# Patient Record
Sex: MALE | Race: WHITE | NOT HISPANIC OR LATINO
[De-identification: names, ages, dates, MRNs, and addresses within clinical notes are randomized per-mention and may not be internally consistent; named-entity substitution may affect disease eponyms.]

---

## 2021-09-17 PROBLEM — Z00.00 ENCOUNTER FOR PREVENTIVE HEALTH EXAMINATION: Status: ACTIVE | Noted: 2021-09-17

## 2021-09-20 ENCOUNTER — APPOINTMENT (OUTPATIENT)
Dept: NEUROLOGY | Facility: CLINIC | Age: 76
End: 2021-09-20
Payer: MEDICARE

## 2021-09-20 ENCOUNTER — NON-APPOINTMENT (OUTPATIENT)
Age: 76
End: 2021-09-20

## 2021-09-20 VITALS
BODY MASS INDEX: 21.47 KG/M2 | HEART RATE: 69 BPM | HEIGHT: 70 IN | DIASTOLIC BLOOD PRESSURE: 72 MMHG | WEIGHT: 150 LBS | TEMPERATURE: 96.3 F | SYSTOLIC BLOOD PRESSURE: 134 MMHG

## 2021-09-20 PROCEDURE — 99205 OFFICE O/P NEW HI 60 MIN: CPT

## 2021-09-20 NOTE — HISTORY OF PRESENT ILLNESS
[FreeTextEntry1] : 76 yr old male with prior history of cognitive issues after getting COVID-19 infection in Nov 2020. He was hospitalized for the COVID 19 infection for 5 days  but was  never intubated. He went home with oxygen. In the spring of 2021 his wife was concerned about his cognitive symptoms that seemed to be progressing. She had noted  he was having  difficulty updating his calendar and keeping up with  his appointments. He would not recall recent conversation. He underwent cognitive evaluation  with Dr. Penaloza in [facility] and was due to have an MRI brain as outpatient. However, on June 22, 2021 he developed a fever and he seemed more confused than usual. He was admitted at [facility] for acute intracerebral hemorrhage. He had an MRI brain but it remains unclear what the cause for the stroke was. He was discharged to [facility] for acute rehab followed by subacute rehab at [facility]. As per his wife he did not have any seizures during his hospital stay. His cognitive function is even worse now since the stroke. He is very slow to respond and due to impaired memory you cannot have a lengthy conversation . He does not recognize family members and recently confused his sister in law with his daughter . He will perseverate and keep pulling on items in the bed . He is still walking with assistance of a walker .  No change sin level of consciousness, irritability, aggression reported.

## 2021-09-20 NOTE — ASSESSMENT
[FreeTextEntry1] : 76 yr old male with prior history of cognitive issues after COVID 19 infection suffered a ICH in June 2021 which resulted in increased severity of cognitive deficits and gait impairment. Based on my exam the patient has marked impairment of fund of knowledge, recent memory, remote memory, and apraxia. I have no records to review at this visit and requested medical release form to obtain prior records. I suggested wife get CD of prior MRI and I will order another MRI/MRA brain for comparison. The patient is on Keppra but unsure if prior ICH truly poses seizure risk till I review prior records and recent EEG. I will order basic labs for review as well \par \par During this visit I did the following :\par  Preparing to see the patient , obtaining and/or reviewing separately obtained history, performing a medically appropriate examination and/or evaluation, counseling and educating the patient/family/caregiver, ordering medications, tests or procedures, documenting clinical information in the electronic or other health record and care coordination , also documented information on [facility] follow up sheet\par Time spent 85 min

## 2021-09-20 NOTE — HISTORY OF PRESENT ILLNESS
[FreeTextEntry1] : 76 yr old male with prior history of cognitive issues after getting COVID-19 infection in Nov 2020. He was hospitalized for the COVID 19 infection for 5 days  but was  never intubated. He went home with oxygen. In the spring of 2021 his wife was concerned about his cognitive symptoms that seemed to be progressing. She had noted  he was having  difficulty updating his calendar and keeping up with  his appointments. He would not recall recent conversation. He underwent cognitive evaluation  with Dr. Penaloza in [facility] and was due to have an MRI brain as outpatient. However, on June 22, 2021 he developed a fever and he seemed more confused than usual. He was admitted at [facility] for acute intracerebral hemorrhage. He had an MRI brain but it remains unclear what the cause for the stroke was. He was discharged to [facility] for acute rehab followed by subacute rehab at [facility]. As per his wife he did not have any seizures during his hospital stay. His cognitive function is even worse now since the stroke. He is very slow to respond and due to impaired memory you cannot have a lengthy conversation . He does not recognize family members and recently confused his sister in law with his daughter . He will perseverate and keep pulling on items in the bed . He is still walking with assistance of a walker .  No change sin level of consciousness, irritability, aggression reported.

## 2021-09-20 NOTE — PHYSICAL EXAM
[FreeTextEntry1] : Constitutional : Patient appears comfortable and well nourished\par Neck : Supple and no carotid bruit\par CVS: s1 and s2 regular rate and rhythm\par Extremity: no cyanosis or edema; peripheral pulses present\par MENTAL STATUS: alert and oriented only to seld, he recognizes wife but was wrong about th eyears they were . He told me the incorrect age of his two children. He is unaware of pandemic , registered 3 objectsn and recall was poor, fund of knowledge appropriate\par Memory: registration of new information    ; 5 minute recall     ; long term memory impaired\par Concentration : can spell world backwords \par Apraxia : Can  not draw clock and continues to write numbers sequentially\par Agnosia: recognizes objects and their function\par Delusions Absent\par MMSE Score \par LANG/SPEECH: Naming and repetition intact, fluent, follows 3-step commands, write a sentence\par CRANIAL NERVES:\par II: Pupils equal and reactive, no RAPD, no VF deficits, normal fundus\par III, IV, VI: EOM intact, no gaze preference or deviation, no nystagmus.\par V: normal sensation in V1, V2, and V3 segments bilaterally\par VII: no asymmetry, no nasolabial fold flattening\par VIII: normal hearing to speech\par IX, X: normal palatal elevation, no uvular deviation\par XI: 5/5 head turn and 5/5 shoulder shrug bilaterally\par XII: midline tongue protrusion\par MOTOR:\par 5/5 muscle power in Rt shoulder abductors/adductors, elbow flexors/extensors, wrist flexors/extensors, finger abductors/adductors.  5/5 in Rt hipflexors/extensors, knee flexors/extensors, ankle dorsiflexors and planter flexors.\par  \par 5/5 muscle power in Lt shoulder abductors/adductors, elbow flexors/extensors, wrist flexors/extensors, finger abductors/adductors.  5/5 in Lt hipflexors/extensors, knee flexors/extensors, ankle dorsiflexors and planter flexors.\par  \par REFLEXES: 1/4 throughout, bilateral flexor planter response, no Bee's, no clonus\par SENSORY:\par Normal to touch, pinprick, \par No hemineglect, no extinction to double sided stimulation (visual & tactile)\par Romberg absent\par COORD: Normal finger to nose and heel to shin, no tremor, no dysmetria\par STATION: when attempted to stand he is very unsteady and tends to shift his base backwards \par He was unable to walk or mimic one foot infront of the other suggesting gait apraxis \par

## 2021-09-27 ENCOUNTER — APPOINTMENT (OUTPATIENT)
Dept: NEUROLOGY | Facility: CLINIC | Age: 76
End: 2021-09-27
Payer: MEDICARE

## 2021-09-27 PROCEDURE — 95816 EEG AWAKE AND DROWSY: CPT

## 2021-09-28 ENCOUNTER — APPOINTMENT (OUTPATIENT)
Dept: NEUROLOGY | Facility: CLINIC | Age: 76
End: 2021-09-28

## 2021-09-28 PROCEDURE — 95700 EEG CONT REC W/VID EEG TECH: CPT

## 2021-09-28 PROCEDURE — 95708 EEG WO VID EA 12-26HR UNMNTR: CPT

## 2021-09-28 PROCEDURE — 95719 EEG PHYS/QHP EA INCR W/O VID: CPT

## 2021-10-27 ENCOUNTER — RESULT REVIEW (OUTPATIENT)
Age: 76
End: 2021-10-27

## 2021-11-04 DIAGNOSIS — Z01.818 ENCOUNTER FOR OTHER PREPROCEDURAL EXAMINATION: ICD-10-CM

## 2021-11-05 ENCOUNTER — APPOINTMENT (OUTPATIENT)
Dept: NEUROSURGERY | Facility: CLINIC | Age: 76
End: 2021-11-05
Payer: MEDICARE

## 2021-11-05 VITALS
HEIGHT: 70 IN | TEMPERATURE: 97.6 F | DIASTOLIC BLOOD PRESSURE: 53 MMHG | HEART RATE: 53 BPM | SYSTOLIC BLOOD PRESSURE: 99 MMHG | BODY MASS INDEX: 20.76 KG/M2 | WEIGHT: 145 LBS

## 2021-11-05 PROCEDURE — 99205 OFFICE O/P NEW HI 60 MIN: CPT

## 2021-11-05 NOTE — HISTORY OF PRESENT ILLNESS
[de-identified] : MIRIAN DOYLE is a 76 year male with a PMH of HTN, COPD, HLD, PPM, Aortic valve repair,  ICH 6/2021 likely related to amyloid angiopathy on Keppra (no known history of seizures), Covid 19 infection 11/2020 who presents to the office today for neurosurgical consultation due to 5mm  anterior communicating artery cerebral aneurysm found on recent outpatient workup for cognitive decline since Spring 2021.  The patient has been having difficulty with memory, confusion, slowness to respond.  He walks with walker.  He denies headaches, weakness, numbness, dizziness.  \par

## 2021-11-05 NOTE — ASSESSMENT
[FreeTextEntry1] : I have discussed the natural history and treatment options for Acomm aneurysms with the patient. I explained the indications for observation and imaging surveillance, medical management endovascular therapies and surgery. I discussed the risks, benefits, possible complications and expected outcome related to each treatment option.  In the end, I said it was reasonable to further evaluate the aneurysm with a diagnostic cerebral angiogram. I said we could alternatively observe the aneurysm with an annual surveillance MRA. In the end, the patient and his wife opted to proceed with the angiogram. My office will reach out to schedule in the coming weeks.The patient was instructed that if he experiences new or worsening headaches, worst headache of his life, nausea/vomiting, visual changes, new weakness or numbness of extremities, he should go immediately to the emergency department or call 911.  The patient demonstrates understanding of the above.\par The patient understands the plan of care and is in agreement.  All questions answered to patient satisfaction.\par \par

## 2021-11-05 NOTE — PHYSICAL EXAM
[General Appearance - Alert] : alert [General Appearance - In No Acute Distress] : in no acute distress [Oriented To Time, Place, And Person] : oriented to person, place, and time [Impaired Insight] : insight and judgment were intact [Affect] : the affect was normal [Person] : oriented to person [Place] : oriented to place [Remote Intact] : remote memory intact [Concentration Intact] : normal concentrating ability [Fluency] : fluency intact [Cranial Nerves Optic (II)] : visual acuity intact bilaterally,  pupils equal round and reactive to light [Cranial Nerves Oculomotor (III)] : extraocular motion intact [Cranial Nerves Trigeminal (V)] : facial sensation intact symmetrically [Cranial Nerves Facial (VII)] : face symmetrical [Cranial Nerves Vestibulocochlear (VIII)] : hearing was intact bilaterally [Cranial Nerves Glossopharyngeal (IX)] : tongue and palate midline [Cranial Nerves Accessory (XI - Cranial And Spinal)] : head turning and shoulder shrug symmetric [Motor Tone] : muscle tone was normal in all four extremities [Cranial Nerves Hypoglossal (XII)] : there was no tongue deviation with protrusion [Motor Strength] : muscle strength was normal in all four extremities [No Muscle Atrophy] : normal bulk in all four extremities [Sensation Tactile Decrease] : light touch was intact [Abnormal Walk] : normal gait [Balance] : balance was intact [Time] : disoriented to time [Short Term Intact] : short term memory impaired [Span Intact] : the attention span was decreased [Comprehension] : comprehension not intact [Current Events] : inadequate knowledge of current events [Past-pointing] : there was no past-pointing [Tremor] : no tremor present [FreeTextEntry5] : slow speech [FreeTextEntry6] : wheel chair, antigravity x 4 extremities, no drift

## 2021-11-05 NOTE — DATA REVIEWED
[de-identified] : \par  [place] MRI Report             Final\par \par No Documents Attached\par \par \par \par \par   [facility]\par                                          [address]\par                                    [place], [place]  [address]\par                                        Department of Radiology\par                                             [phone]\par \par \par Patient Name:      MIRIAN DOYLE                  Location:       [facility]\par Med Rec #:        [id number]                    Account #:      [id number] YOB: 1945                    Ordering:       Jerson Callahan MD\par Age: 76               Sex:    M                 Attending:      Jerson Callahan MD\par PCP:        DOCTOR NOT ON STAFF\par ______________________________________________________________________________________\par \par Exam Date:      10/27/21\par Exam:         MRA BRAIN; MRI BRAIN WAW IC\par Order#:       MRI [id number]; [id number]\par \par \par \par EXAM: MRI brain with and without contrast, MRA head without contrast\par \par  INDICATION: Nonspecific neurological symptoms. History of intracerebral hemorrhage on outside\par imaging\par \par  TECHNIQUE: MR examination of brain performed with and without contrast utilizing axial diffusion-\par weighted/ADC, axial T2 FLAIR, sagittal T2 FLAIR, sagittal/axial scrotal T1 postcontrast coronal T2\par FLAIR, axial T1, sagittal T1, coronal T1, axial susceptibility weighted sequences.\par \par  MR angiography of head performed without contrast utilizing 3-D time-of-flight sequences.\par Multiplanar 3-D maximum intensity projection reconstructions were acquired.\par \par  7.5 mL Gadavist administered intravenously.\par \par  COMPARISON: No prior studies are available for comparison at the time of initial dictation.\par \par  FINDINGS:\par \par  Brain:\par \par  Motion degraded exam.\par \par  There is a heterogeneous T1 hyperintense, T2 hyperintense structure with peripheral hemosiderin\par deposition centered within the right frontal periventricular region measuring approximately 2.1 x 2\par x 2 cm. There is associated susceptibility hypointensity as well as mild peripheral enhancement and\par is compatible with expected evolution of late subacute to chronic intraparenchymal hematoma. There\par is additional curvilinear susceptibility hypointensity region compatible with superficial siderosis,\par chronic sequela of prior subarachnoid hemorrhage. There is associated restricted diffusion which is\par likely secondary to extensive susceptibility hypointensity. No area of suspicious nodular\par enhancement is identified to suggest underlying lesion.\par \par  There are additional foci of susceptibility hypointensity within the right parietal lobe, left\par frontal lobe and right occipital lobe without definite T1 or T2 signal correlate compatible with\par hemosiderin deposition from chronic microhemorrhage.\par \par  Otherwise no abnormal parenchymal, leptomeningeal or pachymeningeal enhancement identified. No\par enhancing intracranial lesion identified.\par \par  Ventricles and sulci are mildly proportionately prominent likely related to mild parenchymal volume\par loss.  . No hydrocephalus or extra-axial fluid collection.\par \par  No abnormal restricted diffusion identified to suggest acute territorial infarction.. Mild\par subcortical and periventricular-white matter T2 weighted hyperintensity, nonspecific but favored to\par reflect sequela of mild chronic microvascular ischemia. . No significant midline shift, mass effect\par or herniation. There is chronic infarction of the left cerebellum\par \par  . Paranasal sinuses and mastoid air cells are well aerated.\par \par  No suspicious expansile or destructive calvarial lesion identified.\par \par  Sella and suprasellar region are unremarkable.\par \par  MRA head:\par \par \par  Normal flow-related signal present within the intracranial segments of bilateral internal carotid\par arteries, proximal bilateral middle cerebral arteries, proximal anterior cerebral arteries and\par ophthalmic arteries without hemodynamically significant stenosis or dissection.\par \par  Superior and inferior divisions of proximal M2 segments of bilateral middle cerebral arteries\par appear to be patent.\par \par  . Bilateral posterior communicating arteries are present prominent. There are hypoplastic P1\par segments of the bilateral posterior cerebral arteries, compatible with fetal origin.\par \par  Normal flow-related signal present within basilar artery, bilateral V4 segments of vertebral\par arteries, proximal bilateral posterior cerebral arteries, proximal bilateral superior cerebellar\par arteries, proximal bilateral posterior inferior cerebellar arteries without hemodynamically\par significant stenosis or dissection.\par \par  There is a 5 x 4 x 5 mm superiorly and posteriorly oriented saccular outpouching arising from the\par left anterior communicating complex, series 8, image 112, series 100, image 6, suspicious for\par aneurysm\par \par \par \par  IMPRESSION:\par \par  MRI BRAIN:\par \par  Findings compatible with expected evolution of late subacute to chronic intraparenchymal hematoma\par within the right frontal periventricular region. Additional curvilinear susceptibility hypointensity\par region compatible with superficial siderosis, chronic sequela of prior subarachnoid hemorrhage. No\par area of suspicious nodular enhancement is identified to suggest underlying lesion.\par \par  Additional foci of susceptibility hypointensity within the right parietal lobe, left frontal lobe\par and right occipital lobe without definite T1 or T2 signal correlate compatible with hemosiderin\par deposition from chronic microhemorrhage.\par \par  Given predominant lobar distribution of late subacute and chronic hemorrhage, possible etiology of\par intracerebral hemorrhage is cerebral amyloid angiopathy.\par \par \par  No acute infarction, extra-axial fluid collection or hydrocephalus.\par \par  MRA head:\par \par \par  5 mm superiorly and posteriorly oriented saccular outpouching arising from the left anterior\par communicating complex, suspicious for aneurysm\par \par  No hemodynamically significant stenosis identified in the proximal intracranial vasculature.\par \par  --- End of Report ---\par \par ***Electronically Signed ***\par -----------------------------------------------\par Mamadou Gtz MD              10/27/21 1508\par \par Dictated on 10/27/21\par \par \par Report cc:  Jerson Callahan MD;\par \par  \par \par  Ordered by: JERSON CALLAHAN       Collected/Examined: 27Oct2021 09:52AM       \par Verified by: JERSON CALLAHAN 28Oct2021 02:55PM       \par  Result Communication: No patient communication needed at this time;\par Stage: Final       \par  Performed at: [facility]       Resulted: 27Oct2021 03:08PM       Last Updated: 28Oct2021 02:55PM       Accession: [id number]

## 2021-11-11 ENCOUNTER — APPOINTMENT (OUTPATIENT)
Dept: NEUROLOGY | Facility: CLINIC | Age: 76
End: 2021-11-11
Payer: MEDICARE

## 2021-11-11 VITALS
SYSTOLIC BLOOD PRESSURE: 115 MMHG | HEIGHT: 70 IN | WEIGHT: 145 LBS | DIASTOLIC BLOOD PRESSURE: 67 MMHG | TEMPERATURE: 97.9 F | BODY MASS INDEX: 20.76 KG/M2 | HEART RATE: 81 BPM

## 2021-11-11 DIAGNOSIS — Z80.6 FAMILY HISTORY OF LEUKEMIA: ICD-10-CM

## 2021-11-11 DIAGNOSIS — Z81.8 FAMILY HISTORY OF OTHER MENTAL AND BEHAVIORAL DISORDERS: ICD-10-CM

## 2021-11-11 DIAGNOSIS — Z82.49 FAMILY HISTORY OF ISCHEMIC HEART DISEASE AND OTHER DISEASES OF THE CIRCULATORY SYSTEM: ICD-10-CM

## 2021-11-11 PROCEDURE — 99215 OFFICE O/P EST HI 40 MIN: CPT

## 2021-11-11 NOTE — DATA REVIEWED
[de-identified] : EEG 24 hour recording abundant slowing in right hemisphere but no electrographic seizures /discharges

## 2021-11-11 NOTE — REVIEW OF SYSTEMS
[Negative] : Integumentary [Change In Personality] : personality change [As Noted in HPI] : as noted in HPI [Confused or Disoriented] : confusion [Memory Lapses or Loss] : memory loss [Changed Thought Patterns] : changed thought patterns [Repeating Questions] : repeated questioning about recent events [Difficulty Walking] : difficulty walking [Loss Of Hearing] : hearing loss [Incontinence] : incontinence [Fever] : no fever [Chills] : no chills [Feeling Poorly] : not feeling poorly [Feeling Tired] : not feeling tired [Suicidal] : not suicidal [Facial Weakness] : no facial weakness [Arm Weakness] : no arm weakness [Hand Weakness] : no hand weakness [Leg Weakness] : no leg weakness [Poor Coordination] : good coordination [Numbness] : no numbness [Tingling] : no tingling [Abnormal Sensation] : no abnormal sensation [Hypersensitivity] : no hypersensitivity [Seizures] : no convulsions [Dizziness] : no dizziness [Fainting] : no fainting [Lightheadedness] : no lightheadedness [Cluster Headache] : no cluster headache [Inability to Walk] : able to walk [Frequent Falls] : not falling [Eye Pain] : no eye pain [Eyesight Problems] : no eyesight problems [Chest Pain] : no chest pain [Palpitations] : no palpitations [Joint Pain] : no joint pain [Joint Swelling] : no joint swelling [Joint Stiffness] : no joint stiffness [FreeTextEntry8] : incontinence likely due to nurse asisstant not there to help him readily

## 2021-11-11 NOTE — HISTORY OF PRESENT ILLNESS
[FreeTextEntry1] : 76 yr old male with prior history of cognitive issues after getting COVID-19 infection in Nov 2020. He was hospitalized for the COVID 19 infection for 5 days  but was  never intubated. He went home with oxygen. In the spring of 2021 his wife was concerned about his cognitive symptoms that seemed to be progressing. She had noted  he was having  difficulty updating his calendar and keeping up with  his appointments. He would not recall recent conversation. He underwent cognitive evaluation  with Dr. Penaloza in [facility] and was due to have an MRI brain as outpatient. However, on June 22, 2021 he developed a fever and he seemed more confused than usual. He was admitted at [facility] for acute intracerebral hemorrhage. He had an MRI brain but it remains unclear what the cause for the stroke was. He was discharged to [facility] for acute rehab followed by subacute rehab at [facility]. As per his wife he did not have any seizures during his hospital stay. His cognitive function is even worse now since the stroke. He is very slow to respond and due to impaired memory you cannot have a lengthy conversation . He does not recognize family members and recently confused his sister in law with his daughter . He will perseverate and keep pulling on items in the bed . He is still walking with assistance of a walker .  No change in level of consciousness, irritability, aggression reported. \par \par The patient now presents for follow up and from cognitive standpoint not much has changed. He was referred to Dr. Bowser after MRA incidentally identified 5 mm acomm aneurysm and is scheduled to have cerebral angiogram

## 2021-11-11 NOTE — ASSESSMENT
[FreeTextEntry1] : 76 yr old male with prior history of cognitive issues after COVID 19 infection suffered a ICH in June 2021 which resulted in increased severity of cognitive deficits and gait impairment. Based on my exam the patient has marked impairment of fund of knowledge, recent memory, remote memory, and apraxia. MRI brain results reviewed with the family on this visit with presence of multiple microhemorrhages there is concern for amyloid angiopathy. MRA brain reviewed with the family as well and incidental acomm aneurysm seen which is unrelated to lobar ICH . We discussed factors that might suggest increase risk of aneurysm hemorrhage which include but not limited to size, location, instability in morphology/shape, time, elevated BP, smoking, etc. They are scheduled to proceed with cerebral angiogram in a few weeks. I reviewed EEG as well with the family at this time with no prior history fo seizures and 24 hr eeg not showing any epileptiform discharges I am not inclined to keep him on Keppra for primary seizure prophylaxis. I did inform them that with this data it suggest the risk for seizure is low but may still be present. Seizure precautions will have to be taken when Keppra tapered off . Keppra can cause agitation and pyschosis so I am interested to see if any improvement in mental status seen after Keppra tapered off \par \par During this visit I did the following :\par  Preparing to see the patient , obtaining and/or reviewing separately obtained history, performing a medically appropriate examination and/or evaluation, counseling and educating the patient/family/caregiver, ordering medications, tests or procedures, documenting clinical information in the electronic or other health record and care coordination , also documented information on [facility] follow up sheet\par Time spent 45  min

## 2021-11-15 ENCOUNTER — APPOINTMENT (OUTPATIENT)
Dept: CARDIOLOGY | Facility: CLINIC | Age: 76
End: 2021-11-15
Payer: MEDICARE

## 2021-11-15 PROCEDURE — 93010 ELECTROCARDIOGRAM REPORT: CPT

## 2021-11-15 PROCEDURE — 93288 INTERROG EVL PM/LDLS PM IP: CPT | Mod: 26

## 2021-11-15 PROCEDURE — 99205 OFFICE O/P NEW HI 60 MIN: CPT

## 2021-11-15 PROCEDURE — 93000 ELECTROCARDIOGRAM COMPLETE: CPT | Mod: 59

## 2021-11-15 RX ORDER — AMLODIPINE BESYLATE 5 MG/1
5 TABLET ORAL
Refills: 0 | Status: ACTIVE | COMMUNITY

## 2021-11-15 RX ORDER — SENNOSIDES 8.6 MG TABLETS 8.6 MG/1
8.6 TABLET ORAL
Refills: 0 | Status: ACTIVE | COMMUNITY

## 2021-11-15 RX ORDER — FAMOTIDINE 20 MG/1
20 TABLET, FILM COATED ORAL
Refills: 0 | Status: ACTIVE | COMMUNITY

## 2021-11-15 RX ORDER — METOPROLOL TARTRATE 25 MG/1
25 TABLET, FILM COATED ORAL
Refills: 0 | Status: ACTIVE | COMMUNITY

## 2021-11-15 RX ORDER — ALBUTEROL 90 MCG
90 AEROSOL (GRAM) INHALATION
Refills: 0 | Status: ACTIVE | COMMUNITY

## 2021-11-15 RX ORDER — MULTIVITAMIN
TABLET ORAL
Refills: 0 | Status: ACTIVE | COMMUNITY

## 2021-11-15 RX ORDER — MAGNESIUM HYDROXIDE 400 MG/5ML
400 SUSPENSION ORAL
Refills: 0 | Status: ACTIVE | COMMUNITY

## 2021-11-15 RX ORDER — ACETAMINOPHEN 325 MG/1
325 TABLET ORAL
Refills: 0 | Status: ACTIVE | COMMUNITY

## 2021-11-15 RX ORDER — DOCUSATE SODIUM 100 MG/1
100 CAPSULE ORAL
Refills: 0 | Status: ACTIVE | COMMUNITY

## 2021-11-15 RX ORDER — ONDANSETRON HYDROCHLORIDE 4 MG/1
4 TABLET, FILM COATED ORAL
Refills: 0 | Status: ACTIVE | COMMUNITY

## 2021-11-15 RX ORDER — FUROSEMIDE 40 MG/1
40 TABLET ORAL
Refills: 0 | Status: ACTIVE | COMMUNITY

## 2021-11-15 RX ORDER — ROSUVASTATIN CALCIUM 5 MG/1
5 TABLET, FILM COATED ORAL
Refills: 0 | Status: ACTIVE | COMMUNITY

## 2021-11-15 RX ORDER — ACETAMINOPHEN 160 MG/5ML
500 SUSPENSION ORAL
Refills: 0 | Status: ACTIVE | COMMUNITY

## 2021-11-15 RX ORDER — LORATADINE 10 MG
17 TABLET,DISINTEGRATING ORAL
Refills: 0 | Status: ACTIVE | COMMUNITY

## 2021-11-15 RX ORDER — MELATONIN 200 MCG
3 TABLET ORAL
Refills: 0 | Status: ACTIVE | COMMUNITY

## 2021-11-15 RX ORDER — LOSARTAN POTASSIUM 100 MG/1
100 TABLET, FILM COATED ORAL
Refills: 0 | Status: ACTIVE | COMMUNITY

## 2021-11-16 ENCOUNTER — NON-APPOINTMENT (OUTPATIENT)
Age: 76
End: 2021-11-16
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
## 2021-11-16 NOTE — PHYSICAL EXAM
[Well Developed] : well developed [Well Nourished] : well nourished [No Acute Distress] : no acute distress [Normal Conjunctiva] : normal conjunctiva [Normal Venous Pressure] : normal venous pressure [No Carotid Bruit] : no carotid bruit [Normal S1, S2] : normal S1, S2 [No Rub] : no rub [No Gallop] : no gallop [Clear Lung Fields] : clear lung fields [Good Air Entry] : good air entry [No Respiratory Distress] : no respiratory distress  [Soft] : abdomen soft [Non Tender] : non-tender [No Masses/organomegaly] : no masses/organomegaly [Normal Bowel Sounds] : normal bowel sounds [Normal Gait] : normal gait [No Edema] : no edema [No Cyanosis] : no cyanosis [No Clubbing] : no clubbing [No Varicosities] : no varicosities [No Rash] : no rash [No Skin Lesions] : no skin lesions [Moves all extremities] : moves all extremities [No Focal Deficits] : no focal deficits [Normal Speech] : normal speech [Alert and Oriented] : alert and oriented [Normal memory] : normal memory [de-identified] : grade 1 systolic ejection murmur aortic area. Second sound of good quality. Pacemaker battery site okay.

## 2021-11-16 NOTE — DISCUSSION/SUMMARY
[FreeTextEntry1] : The pt. has had 3 major historical events. He suffered a COVIDCinfection in November 2020 which was significant respiratory symptoms but the patient recovered fully. Subsequently he suffered an intracerebral hemorrhage in July of this year treated in [place]. Currently the patient is at the [facility]. He has made gradual neurologic recovery but remains considerably disabled. He is mostly confined to a wheelchair ambulation and balance are very unsteady.\par Patient has been evaluated by multiple neurology who performed a number of imaging studies including MRI which reveals evidence of cerebral amyloid angiopathy. This would make any anticoagulation including antiplatelet agents contraindicated. The patient's blood pressure has been well-controlled on the current regimen which will be continued.\par Several years ago the patient underwent aortic valve replacement with a tissue valve because of bicuspid aortic valve. This was done at [place]. The details of the surgery are not available to me at this time but I suspect an aortic root repair may have also been required. At that time he also required a permanent pacemaker.\par Patient's cardiac exam today is entirely satisfactory. A test to pacemaker function was normal. The patient's underlying electrocardiographic reveals sinus rhythm with ventricular pacing with AV tracking\par The findings were discussed with the patient and his wife. I believe his cardiac and neurologic status are stable.\par Patient will be returning to his residence in [place] within the next several weeks. However if he must stay longer in this area we will be available to check his cardiac status and pacemaker function.

## 2021-11-24 ENCOUNTER — RESULT REVIEW (OUTPATIENT)
Age: 76
End: 2021-11-24

## 2021-11-24 ENCOUNTER — APPOINTMENT (OUTPATIENT)
Dept: NEUROSURGERY | Facility: HOSPITAL | Age: 76
End: 2021-11-24

## 2021-12-07 DIAGNOSIS — Z01.812 ENCOUNTER FOR PREPROCEDURAL LABORATORY EXAMINATION: ICD-10-CM

## 2021-12-23 ENCOUNTER — APPOINTMENT (OUTPATIENT)
Dept: NEUROLOGY | Facility: CLINIC | Age: 76
End: 2021-12-23

## 2022-01-07 ENCOUNTER — APPOINTMENT (OUTPATIENT)
Dept: NEUROLOGY | Facility: CLINIC | Age: 77
End: 2022-01-07
Payer: MEDICARE

## 2022-01-07 DIAGNOSIS — I61.9 NONTRAUMATIC INTRACEREBRAL HEMORRHAGE, UNSPECIFIED: ICD-10-CM

## 2022-01-07 DIAGNOSIS — I10 ESSENTIAL (PRIMARY) HYPERTENSION: ICD-10-CM

## 2022-01-07 DIAGNOSIS — Z87.09 PERSONAL HISTORY OF OTHER DISEASES OF THE RESPIRATORY SYSTEM: ICD-10-CM

## 2022-01-07 DIAGNOSIS — Z95.0 PRESENCE OF CARDIAC PACEMAKER: ICD-10-CM

## 2022-01-07 DIAGNOSIS — I67.1 CEREBRAL ANEURYSM, NONRUPTURED: ICD-10-CM

## 2022-01-07 DIAGNOSIS — Z86.16 PERSONAL HISTORY OF COVID-19: ICD-10-CM

## 2022-01-07 DIAGNOSIS — Z86.39 PERSONAL HISTORY OF OTHER ENDOCRINE, NUTRITIONAL AND METABOLIC DISEASE: ICD-10-CM

## 2022-01-07 DIAGNOSIS — E54 ASCORBIC ACID DEFICIENCY: ICD-10-CM

## 2022-01-07 PROCEDURE — 99214 OFFICE O/P EST MOD 30 MIN: CPT | Mod: 95

## 2022-01-07 RX ORDER — LEVETIRACETAM 500 MG/1
500 TABLET, FILM COATED ORAL
Refills: 0 | Status: DISCONTINUED | COMMUNITY
End: 2022-01-07

## 2022-01-07 NOTE — HISTORY OF PRESENT ILLNESS
[Spouse] : spouse [FreeTextEntry3] : Kimberly Schumacher  [Home] : at home, [unfilled] , at the time of the visit. [Medical Office: ([facility])___] : at the medical office located in  [Verbal consent obtained from patient] : the patient, [unfilled] [FreeTextEntry1] : 76 yr old male with prior history of cognitive issues after getting COVID-19 infection in Nov 2020. He was hospitalized for the COVID 19 infection for 5 days  but was  never intubated. He went home with oxygen. In the spring of 2021 his wife was concerned about his cognitive symptoms that seemed to be progressing. She had noted  he was having  difficulty updating his calendar and keeping up with  his appointments. He would not recall recent conversation. He underwent cognitive evaluation  with Dr. Penaloza in [facility] and was due to have an MRI brain as outpatient. However, on June 22, 2021 he developed a fever and he seemed more confused than usual. He was admitted at [facility] for acute intracerebral hemorrhage. . He was discharged to [facility] for acute rehab followed by subacute rehab at [facility] and now been home for about 3 weeks. As per his wife he did not have any seizures during his hospital stay. His cognitive function is even worse now since the stroke. He is very slow to respond and due to impaired memory you cannot have a lengthy conversation . He does not recognize family members and recently confused his sister in law with his daughter . He has improved with gait and now only using walker intermittently since he came home from [facility]. Incontinence has since resolved as well. He seems more pleasant and happy to be around family at home \par \par The patient now presents for follow up and from cognitive standpoint not much has changed. He was referred to Dr. 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 Angiogram was attempted but could not be completed due to tortuosity of vessel and patient motion  \par \par Keppra tapered off and no seizure like activity noted. He seems to be less agitated but also he moved from [facility] to home as well.

## 2022-01-07 NOTE — ASSESSMENT
[FreeTextEntry1] : 76 yr old male with prior history of cognitive issues after COVID 19 infection suffered a ICH in June 2021 which resulted in increased severity of cognitive deficits and gait impairment. Based on my exam the patient has marked impairment of fund of knowledge, recent memory, remote memory, and apraxia. MRI brain results multiple microhemorrhages so ICH likley due to amyloid angiopathy. MRA brain identified  incidental acomm aneurysm seen which is unrelated to lobar ICH . We discussed factors that might suggest increase risk of aneurysm hemorrhage which include but not limited to size, location, instability in morphology/shape, time, elevated BP, smoking, etc. \par His wife assures me BP monitoring at home has ranged from  and lower . \par \par Angiogram was attempted but due to tortuosity of the vessel and patient movement the study was not able to be completed. The plan from neurosurgical standpoint is to monitor the aneurysm with noninvasive imaging and Dr. Bowser has referred him for CTA brain \par \par We discussed persistent cognitive deficits related to stroke consistent with vascular dementia. He is currently receiving PT but gave the option of adding memantine at this time which may offer modest benefit. I reviewed potential side effects of this medication as well and she will review and get back to me about it \par \par

## 2022-01-07 NOTE — REVIEW OF SYSTEMS
[Change In Personality] : personality change [As Noted in HPI] : as noted in HPI [Confused or Disoriented] : confusion [Memory Lapses or Loss] : memory loss [Changed Thought Patterns] : changed thought patterns [Repeating Questions] : repeated questioning about recent events [Difficulty Walking] : difficulty walking [Loss Of Hearing] : hearing loss [Incontinence] : incontinence [Negative] : Integumentary [Fever] : no fever [Chills] : no chills [Feeling Poorly] : not feeling poorly [Feeling Tired] : not feeling tired [Suicidal] : not suicidal [Facial Weakness] : no facial weakness [Arm Weakness] : no arm weakness [Hand Weakness] : no hand weakness [Leg Weakness] : no leg weakness [Poor Coordination] : good coordination [Numbness] : no numbness [Tingling] : no tingling [Abnormal Sensation] : no abnormal sensation [Hypersensitivity] : no hypersensitivity [Seizures] : no convulsions [Dizziness] : no dizziness [Fainting] : no fainting [Lightheadedness] : no lightheadedness [Cluster Headache] : no cluster headache [Inability to Walk] : able to walk [Frequent Falls] : not falling [Eye Pain] : no eye pain [Eyesight Problems] : no eyesight problems [Chest Pain] : no chest pain [Palpitations] : no palpitations [Joint Pain] : no joint pain [Joint Swelling] : no joint swelling [Joint Stiffness] : no joint stiffness [FreeTextEntry8] : incontinence likely due to nurse asisstant not there to help him readily

## 2022-01-07 NOTE — DATA REVIEWED
[de-identified] : EEG 24 hour recording abundant slowing in right hemisphere but no electrographic seizures /discharges

## 2022-01-07 NOTE — PHYSICAL EXAM
[FreeTextEntry1] : Telehealth exam very limited with patient with cogntive impairment \par Constitutional : Patient appears comfortable and well nourished\par sent\par MENTAL STATUS: alert and oriented only to self, Incorrect about month and year.  He knows home address but incorrectly said he lived there 2 years when that has been his home for over 35 year.s , r\par Memory: registration of new information intact but short term recall impaired    ; long term memory impaired\par Can name objects and tell your their functoin\par No righty left confusion\par Follows verbal requests \par Speech is clear and mildly dysarthric\par LANG/SPEECH: Naming and repetition intact, fluent, follows verbal requests\par CRANIAL NERVES:\par \par III, IV, VI: EOM intact, no gaze preference or deviation, \par V: normal sensation in V1, V2, and V3 segments bilaterally denies any loss of sensatoin\par VII: no asymmetry, no nasolabial fold flattening\par VIII: normal hearing to speech\par IX, X: normal palatal elevation, no uvular deviation\par XI: 5/5 head turn and 5/5 shoulder shrug bilaterally\par XII: midline tongue protrusion\par MOTOR:\par able to lift both arms antigravity and no drift seen in both arms \par able to bear weight on both legs and ambulate \par  Sensory: reports no abnormality to tactile stimuli \par COORD: Normal finger to nose and heel to shin, no tremor, no dysmetria\par STATION: able to stand up and ambulate with mild unsteadiness requiring him to reach for rail for support\par

## 2022-01-11 ENCOUNTER — RESULT REVIEW (OUTPATIENT)
Age: 77
End: 2022-01-11

## 2022-01-13 ENCOUNTER — NON-APPOINTMENT (OUTPATIENT)
Age: 77
End: 2022-01-13

## 2022-01-15 ENCOUNTER — NON-APPOINTMENT (OUTPATIENT)
Age: 77
End: 2022-01-15